# Patient Record
Sex: MALE | Race: WHITE | NOT HISPANIC OR LATINO | Employment: OTHER | URBAN - METROPOLITAN AREA
[De-identification: names, ages, dates, MRNs, and addresses within clinical notes are randomized per-mention and may not be internally consistent; named-entity substitution may affect disease eponyms.]

---

## 2021-03-02 DIAGNOSIS — Z23 IMMUNIZATION DUE: ICD-10-CM

## 2021-07-13 ENCOUNTER — OFFICE VISIT (OUTPATIENT)
Dept: GASTROENTEROLOGY | Facility: CLINIC | Age: 76
End: 2021-07-13

## 2021-07-13 VITALS
HEIGHT: 74 IN | BODY MASS INDEX: 30.54 KG/M2 | SYSTOLIC BLOOD PRESSURE: 128 MMHG | WEIGHT: 238 LBS | DIASTOLIC BLOOD PRESSURE: 72 MMHG

## 2021-07-13 DIAGNOSIS — K62.5 RECTAL BLEEDING: Primary | ICD-10-CM

## 2021-07-13 DIAGNOSIS — Z86.010 HISTORY OF COLON POLYPS: ICD-10-CM

## 2021-07-13 DIAGNOSIS — Z79.01 ANTICOAGULATED: ICD-10-CM

## 2021-07-13 PROCEDURE — 99204 OFFICE O/P NEW MOD 45 MIN: CPT | Performed by: INTERNAL MEDICINE

## 2021-07-13 RX ORDER — SODIUM CHLORIDE, SODIUM LACTATE, POTASSIUM CHLORIDE, CALCIUM CHLORIDE 600; 310; 30; 20 MG/100ML; MG/100ML; MG/100ML; MG/100ML
30 INJECTION, SOLUTION INTRAVENOUS CONTINUOUS
Status: CANCELLED | OUTPATIENT
Start: 2021-09-10

## 2021-07-13 NOTE — PROGRESS NOTES
Chief Complaint   Patient presents with   • Rectal Bleeding       Subjective     HPI    Bruce Moran is a 75 y.o. male with a past medical history noted below who presents for rectal bleeding.  Symptoms present for 1 month.  Reports bright red blood with wiping, stools normal color.  Not painful.  Reports soft BMs that are easy to pass.  He has not has significant issues with bleeding before.    He is on warfarin, he has a mechanical heart valve, followed by Dr Singh.      Diagnosed with Crohn's disease with Dr Smith.  Also history of colon polyps.  Says Crohn's diagnosed in 1987.  Reports at one time had 13 polyps removed.  Does not think he has had a scope in over 20 years ago.  Says he was on steroids, flagyl.  Says no meds since 2000.  Denies active disease symptoms.    No family history of GI malignancies.    No current pcp.  No recent labs.    Hx of laparascopic hernia repair.  He says he was able to bridge warfarin at that time.    Quit smoking 1991. No excess ETOH    Retired retail.          Today's visit was in the office.  Both the patient and I were wearing face masks and proper hand hygiene was performed before and after the physical exam.           Current Outpatient Medications:   •  digoxin (LANOXIN) 125 MCG tablet, Take 125 mcg by mouth., Disp: , Rfl:   •  propranolol LA (INDERAL LA) 60 MG 24 hr capsule, Take  by mouth., Disp: , Rfl:   •  warfarin (COUMADIN) 5 MG tablet, Take 1/2 tablet on  Monday, Wednesday, and friday, Disp: , Rfl:       Objective     Vitals:    07/13/21 1344   BP: 128/72         07/13/21  1344   Weight: 108 kg (238 lb)     Body mass index is 30.56 kg/m².    Physical Exam  Vitals reviewed.   Constitutional:       General: He is not in acute distress.     Appearance: Normal appearance. He is well-developed. He is not diaphoretic.   HENT:      Head: Normocephalic.   Neck:      Thyroid: No thyromegaly.   Cardiovascular:      Rate and Rhythm: Normal rate and regular rhythm.    Pulmonary:      Effort: Pulmonary effort is normal. No respiratory distress.      Breath sounds: Normal breath sounds. No wheezing.   Abdominal:      General: Bowel sounds are normal. There is no distension.      Palpations: Abdomen is soft. Abdomen is not rigid. There is no mass.      Tenderness: There is no abdominal tenderness. There is no guarding or rebound.      Hernia: No hernia is present.   Genitourinary:     Comments: Rectal exam deferred  Musculoskeletal:         General: No tenderness.   Neurological:      Mental Status: He is alert and oriented to person, place, and time.      Motor: No atrophy.      Coordination: Coordination normal.   Psychiatric:         Behavior: Behavior normal.         Thought Content: Thought content normal.         Judgment: Judgment normal.             WBC   Date Value Ref Range Status   04/19/2019 7.10 4.5 - 11.0 10*3/uL Final     RBC   Date Value Ref Range Status   04/19/2019 5.27 4.5 - 5.9 10*6/uL Final     Hemoglobin   Date Value Ref Range Status   04/19/2019 15.2 13.5 - 17.5 g/dL Final     Hematocrit   Date Value Ref Range Status   04/19/2019 47.8 41.0 - 53.0 % Final     MCV   Date Value Ref Range Status   04/19/2019 90.7 80.0 - 100.0 fL Final     MCH   Date Value Ref Range Status   04/19/2019 28.8 26.0 - 34.0 pg Final     MCHC   Date Value Ref Range Status   04/19/2019 31.8 31.0 - 37.0 g/dL Final     RDW   Date Value Ref Range Status   04/19/2019 14.9 12.0 - 16.8 % Final     MPV   Date Value Ref Range Status   04/19/2019 11.8 (H) 6.7 - 10.8 fL Final     Platelets   Date Value Ref Range Status   04/19/2019 224 140 - 440 10*3/uL Final     Neutrophil Rel %   Date Value Ref Range Status   04/19/2019 70.7 45 - 80 % Final     Lymphocyte Rel %   Date Value Ref Range Status   04/19/2019 20.8 15 - 50 % Final     Monocyte Rel %   Date Value Ref Range Status   04/19/2019 6.6 0 - 15 % Final     Eosinophil %   Date Value Ref Range Status   04/19/2019 1.5 0 - 7 % Final     Basophil  Rel %   Date Value Ref Range Status   04/19/2019 0.3 0 - 2 % Final     Immature Grans %   Date Value Ref Range Status   04/19/2019 0.1 (H) 0 % Final     Neutrophils Absolute   Date Value Ref Range Status   04/19/2019 5.01 2.0 - 8.8 10*3/uL Final     Lymphocytes Absolute   Date Value Ref Range Status   04/19/2019 1.48 0.7 - 5.5 10*3/uL Final     Monocytes Absolute   Date Value Ref Range Status   04/19/2019 0.47 0.0 - 1.7 10*3/uL Final     Eosinophils Absolute   Date Value Ref Range Status   04/19/2019 0.11 0.0 - 0.8 10*3/uL Final     Basophils Absolute   Date Value Ref Range Status   04/19/2019 0.02 0.0 - 0.2 10*3/uL Final     Immature Grans, Absolute   Date Value Ref Range Status   04/19/2019 0.01 <1 10*3/uL Final     nRBC   Date Value Ref Range Status   04/19/2019 0 0 /100(WBC) Final       Lab Results   Component Value Date    BUN 13 04/19/2019    CREATININE 1.4 04/19/2019    EGFRIFNONA 59 (L) 11/17/2016    EGFRIFAFRI 72 11/17/2016    BCR 9.3 04/19/2019    CO2 29 04/19/2019    CALCIUM 9.1 04/19/2019    PROTENTOTREF 7.2 11/17/2016    ALBUMIN 4.2 04/19/2019    LABIL2 1.0 (L) 04/19/2019    AST 25 04/19/2019    ALT 17 04/19/2019         Imaging Results (Last 7 Days)     ** No results found for the last 168 hours. **          I personally reviewed data as detailed below:     The labs listed above.    Office notes from: 2/17/21 cardiologist.        No notes on file    Assessment/Plan    1. Rectal bleeding: new acute issue over the past month.  This is worrisome in a 76yo M long overdue for surveillance colonoscopy.  New diagnosis, uncertain prognosis    2. Personal history of colon polyps: reports 13 polyps in the past, over 20 years since his last scope by his estimate    3. Anticoagulated: hx of mechanical heart valve, he says he has been bridged on lovenox for prior procedures    Plan  Labs today  Proceed with colonoscopy  Will reach out to his cardiologist regarding management of warfarin and lovenox bridge prior to  endoscopy    Diagnoses and all orders for this visit:    1. Rectal bleeding (Primary)  -     CBC & Differential  -     Comprehensive Metabolic Panel  -     Case Request; Standing  -     Follow Anesthesia Guidelines / Standing Orders; Future  -     Obtain Informed Consent; Future  -     Implement Anesthesia Orders Day of Procedure; Standing  -     Obtain Informed Consent; Standing  -     Verify bowel prep was successful; Standing  -     lactated ringers infusion  -     Case Request    2. Anticoagulated  -     CBC & Differential  -     Comprehensive Metabolic Panel    3. History of colon polyps  -     Case Request; Standing  -     Follow Anesthesia Guidelines / Standing Orders; Future  -     Obtain Informed Consent; Future  -     Implement Anesthesia Orders Day of Procedure; Standing  -     Obtain Informed Consent; Standing  -     Verify bowel prep was successful; Standing  -     lactated ringers infusion  -     Case Request        I have discussed the above plan with the patient.  They verbalize understanding and are in agreement with the plan.  They have been advised to contact the office for any questions, concerns, or changes related to their health.    Dictated utilizing Dragon dictation

## 2021-07-14 ENCOUNTER — TELEPHONE (OUTPATIENT)
Dept: GASTROENTEROLOGY | Facility: CLINIC | Age: 76
End: 2021-07-14

## 2021-07-14 LAB
ALBUMIN SERPL-MCNC: 4.5 G/DL (ref 3.5–5.2)
ALBUMIN/GLOB SERPL: 1.5 G/DL
ALP SERPL-CCNC: 67 U/L (ref 39–117)
ALT SERPL-CCNC: 13 U/L (ref 1–41)
AST SERPL-CCNC: 16 U/L (ref 1–40)
BASOPHILS # BLD AUTO: 0.03 10*3/MM3 (ref 0–0.2)
BASOPHILS NFR BLD AUTO: 0.4 % (ref 0–1.5)
BILIRUB SERPL-MCNC: 1.2 MG/DL (ref 0–1.2)
BUN SERPL-MCNC: 13 MG/DL (ref 8–23)
BUN/CREAT SERPL: 9.4 (ref 7–25)
CALCIUM SERPL-MCNC: 9.7 MG/DL (ref 8.6–10.5)
CHLORIDE SERPL-SCNC: 102 MMOL/L (ref 98–107)
CO2 SERPL-SCNC: 27 MMOL/L (ref 22–29)
CREAT SERPL-MCNC: 1.39 MG/DL (ref 0.76–1.27)
EOSINOPHIL # BLD AUTO: 0.04 10*3/MM3 (ref 0–0.4)
EOSINOPHIL NFR BLD AUTO: 0.5 % (ref 0.3–6.2)
ERYTHROCYTE [DISTWIDTH] IN BLOOD BY AUTOMATED COUNT: 13.3 % (ref 12.3–15.4)
GLOBULIN SER CALC-MCNC: 3.1 GM/DL
GLUCOSE SERPL-MCNC: 82 MG/DL (ref 65–99)
HCT VFR BLD AUTO: 45.5 % (ref 37.5–51)
HGB BLD-MCNC: 15.3 G/DL (ref 13–17.7)
IMM GRANULOCYTES # BLD AUTO: 0.05 10*3/MM3 (ref 0–0.05)
IMM GRANULOCYTES NFR BLD AUTO: 0.6 % (ref 0–0.5)
LYMPHOCYTES # BLD AUTO: 1.53 10*3/MM3 (ref 0.7–3.1)
LYMPHOCYTES NFR BLD AUTO: 18.1 % (ref 19.6–45.3)
MCH RBC QN AUTO: 29 PG (ref 26.6–33)
MCHC RBC AUTO-ENTMCNC: 33.6 G/DL (ref 31.5–35.7)
MCV RBC AUTO: 86.3 FL (ref 79–97)
MONOCYTES # BLD AUTO: 0.51 10*3/MM3 (ref 0.1–0.9)
MONOCYTES NFR BLD AUTO: 6 % (ref 5–12)
NEUTROPHILS # BLD AUTO: 6.27 10*3/MM3 (ref 1.7–7)
NEUTROPHILS NFR BLD AUTO: 74.4 % (ref 42.7–76)
NRBC BLD AUTO-RTO: 0 /100 WBC (ref 0–0.2)
PLATELET # BLD AUTO: 309 10*3/MM3 (ref 140–450)
POTASSIUM SERPL-SCNC: 4.5 MMOL/L (ref 3.5–5.2)
PROT SERPL-MCNC: 7.6 G/DL (ref 6–8.5)
RBC # BLD AUTO: 5.27 10*6/MM3 (ref 4.14–5.8)
SODIUM SERPL-SCNC: 141 MMOL/L (ref 136–145)
WBC # BLD AUTO: 8.43 10*3/MM3 (ref 3.4–10.8)

## 2021-07-14 NOTE — TELEPHONE ENCOUNTER
Request faxed  to DR Liban Snigh' office @ [phone] checking if pt may hold warfarin for 5 days prior to c/s to be scheduled, and to coordinate lovenox bridge if needed.  Confirmation received.

## 2021-07-14 NOTE — TELEPHONE ENCOUNTER
----- Message from Jeniffer Parker MD sent at 7/13/2021  7:52 PM EDT -----  Can we reach out to his cardiologist, Dr Singh, regarding holding is warfarin and likely need for lovenox bridge prior to his colonoscopy, thx

## 2021-07-16 NOTE — PROGRESS NOTES
Lab testing with normal CBC.    Normal liver testing, normal electrolytes.  His renal function is a little off but stable to where he was 2 years ago.    Does he have a nephrologist?  If not, I can refer him.

## 2021-07-19 ENCOUNTER — TELEPHONE (OUTPATIENT)
Dept: GASTROENTEROLOGY | Facility: CLINIC | Age: 76
End: 2021-07-19

## 2021-07-19 DIAGNOSIS — R79.89 ELEVATED SERUM CREATININE: Primary | ICD-10-CM

## 2021-07-19 NOTE — TELEPHONE ENCOUNTER
----- Message from Jeniffer Parker MD sent at 7/16/2021  4:03 PM EDT -----  Lab testing with normal CBC.    Normal liver testing, normal electrolytes.  His renal function is a little off but stable to where he was 2 years ago.    Does he have a nephrologist?  If not, I can refer him.

## 2021-07-19 NOTE — TELEPHONE ENCOUNTER
Call to DR Jair Alvarado's office and spoke with Emma.  Request response re: holding coumadin.  Awaiting reply.

## 2021-07-20 NOTE — TELEPHONE ENCOUNTER
Called pt and advised of Dr Parker note. Verb understanding.     Pt would like referral to nephrologist.  Message sent to Dr Parker.

## 2021-07-21 ENCOUNTER — TELEPHONE (OUTPATIENT)
Dept: GASTROENTEROLOGY | Facility: CLINIC | Age: 76
End: 2021-07-21

## 2021-07-21 PROBLEM — K62.5 RECTAL BLEEDING: Status: ACTIVE | Noted: 2021-07-21

## 2021-07-21 PROBLEM — Z86.010 HISTORY OF COLON POLYPS: Status: ACTIVE | Noted: 2021-07-21

## 2021-07-28 NOTE — TELEPHONE ENCOUNTER
Telephone Encounter - Florian Mckeon MD - 07/20/2021 4:56 PM EDT  Formatting of this note might be different from the original.  Will need Lovenox bridge with mechanical mitral valve. Patient is at acceptable cardiac risk for upcoming colonoscopy.    Paper from GI asks to be off Coumadin for 5 days. May hold Coumadin 5 days before colonoscopy and start Lovenox bridge 1 mg/kg subcutaneously twice daily 3 days before colonoscopy. Hold Lovenox the day of colonoscopy and resume Lovenox and Coumadin after colonoscopy as soon as possible when hemostasis obtained and when okay with performing provider. Discontinue Lovenox when post colonoscopy INR is 2.5 or greater. Coordinate with [facility] where pt follows.    Electronically signed by Florian Mckeon MD at 07/20/2021 5:01 PM EDT    **Call to pt.  Advise of above - hold 9/5 - 9/10.  Verb understanding.      Update to Dr Parker.  Regina Farnsworth RN.

## 2021-09-10 ENCOUNTER — ANESTHESIA EVENT (OUTPATIENT)
Dept: GASTROENTEROLOGY | Facility: HOSPITAL | Age: 76
End: 2021-09-10

## 2021-09-10 ENCOUNTER — ANESTHESIA (OUTPATIENT)
Dept: GASTROENTEROLOGY | Facility: HOSPITAL | Age: 76
End: 2021-09-10

## 2021-09-10 ENCOUNTER — HOSPITAL ENCOUNTER (OUTPATIENT)
Facility: HOSPITAL | Age: 76
Setting detail: HOSPITAL OUTPATIENT SURGERY
Discharge: HOME | End: 2021-09-10
Attending: INTERNAL MEDICINE | Admitting: INTERNAL MEDICINE

## 2021-09-10 VITALS
SYSTOLIC BLOOD PRESSURE: 102 MMHG | TEMPERATURE: 98 F | HEIGHT: 74 IN | HEART RATE: 83 BPM | OXYGEN SATURATION: 96 % | DIASTOLIC BLOOD PRESSURE: 62 MMHG | BODY MASS INDEX: 30.4 KG/M2 | WEIGHT: 236.9 LBS | RESPIRATION RATE: 18 BRPM

## 2021-09-10 DIAGNOSIS — Z87.19 HISTORY OF CROHN'S DISEASE: Primary | ICD-10-CM

## 2021-09-10 DIAGNOSIS — Z86.010 HISTORY OF COLON POLYPS: ICD-10-CM

## 2021-09-10 DIAGNOSIS — K62.5 RECTAL BLEEDING: ICD-10-CM

## 2021-09-10 PROCEDURE — C1889 IMPLANT/INSERT DEVICE, NOC: HCPCS | Performed by: INTERNAL MEDICINE

## 2021-09-10 PROCEDURE — 45385 COLONOSCOPY W/LESION REMOVAL: CPT | Performed by: INTERNAL MEDICINE

## 2021-09-10 PROCEDURE — 45380 COLONOSCOPY AND BIOPSY: CPT | Performed by: INTERNAL MEDICINE

## 2021-09-10 PROCEDURE — 88305 TISSUE EXAM BY PATHOLOGIST: CPT | Performed by: INTERNAL MEDICINE

## 2021-09-10 PROCEDURE — 25010000002 PROPOFOL 10 MG/ML EMULSION: Performed by: REGISTERED NURSE

## 2021-09-10 PROCEDURE — S0260 H&P FOR SURGERY: HCPCS | Performed by: INTERNAL MEDICINE

## 2021-09-10 PROCEDURE — 25010000002 PHENYLEPHRINE PER 1 ML: Performed by: REGISTERED NURSE

## 2021-09-10 DEVICE — DEV CLIP ENDO RESOLUTION360 CONTRL ROT 235CM: Type: IMPLANTABLE DEVICE | Site: SIGMOID COLON | Status: FUNCTIONAL

## 2021-09-10 RX ORDER — SODIUM CHLORIDE 0.9 % (FLUSH) 0.9 %
10 SYRINGE (ML) INJECTION AS NEEDED
Status: DISCONTINUED | OUTPATIENT
Start: 2021-09-10 | End: 2021-09-10 | Stop reason: HOSPADM

## 2021-09-10 RX ORDER — SODIUM CHLORIDE 0.9 % (FLUSH) 0.9 %
3 SYRINGE (ML) INJECTION EVERY 12 HOURS SCHEDULED
Status: DISCONTINUED | OUTPATIENT
Start: 2021-09-10 | End: 2021-09-10 | Stop reason: HOSPADM

## 2021-09-10 RX ORDER — LIDOCAINE HYDROCHLORIDE 20 MG/ML
INJECTION, SOLUTION INFILTRATION; PERINEURAL AS NEEDED
Status: DISCONTINUED | OUTPATIENT
Start: 2021-09-10 | End: 2021-09-10 | Stop reason: SURG

## 2021-09-10 RX ORDER — SODIUM CHLORIDE, SODIUM LACTATE, POTASSIUM CHLORIDE, CALCIUM CHLORIDE 600; 310; 30; 20 MG/100ML; MG/100ML; MG/100ML; MG/100ML
30 INJECTION, SOLUTION INTRAVENOUS CONTINUOUS PRN
Status: DISCONTINUED | OUTPATIENT
Start: 2021-09-10 | End: 2021-09-10 | Stop reason: HOSPADM

## 2021-09-10 RX ORDER — SODIUM CHLORIDE, SODIUM LACTATE, POTASSIUM CHLORIDE, CALCIUM CHLORIDE 600; 310; 30; 20 MG/100ML; MG/100ML; MG/100ML; MG/100ML
30 INJECTION, SOLUTION INTRAVENOUS CONTINUOUS
Status: DISCONTINUED | OUTPATIENT
Start: 2021-09-10 | End: 2021-09-10 | Stop reason: HOSPADM

## 2021-09-10 RX ORDER — PROPOFOL 10 MG/ML
VIAL (ML) INTRAVENOUS AS NEEDED
Status: DISCONTINUED | OUTPATIENT
Start: 2021-09-10 | End: 2021-09-10 | Stop reason: SURG

## 2021-09-10 RX ADMIN — LIDOCAINE HYDROCHLORIDE 60 MG: 20 INJECTION, SOLUTION INFILTRATION; PERINEURAL at 14:08

## 2021-09-10 RX ADMIN — PHENYLEPHRINE HYDROCHLORIDE 100 MCG: 10 INJECTION INTRAVENOUS at 14:22

## 2021-09-10 RX ADMIN — PROPOFOL 180 MCG/KG/MIN: 10 INJECTION, EMULSION INTRAVENOUS at 14:08

## 2021-09-10 RX ADMIN — PROPOFOL 60 MG: 10 INJECTION, EMULSION INTRAVENOUS at 14:08

## 2021-09-10 RX ADMIN — SODIUM CHLORIDE, POTASSIUM CHLORIDE, SODIUM LACTATE AND CALCIUM CHLORIDE 30 ML/HR: 600; 310; 30; 20 INJECTION, SOLUTION INTRAVENOUS at 12:56

## 2021-09-10 RX ADMIN — PHENYLEPHRINE HYDROCHLORIDE 200 MCG: 10 INJECTION INTRAVENOUS at 14:26

## 2021-09-10 RX ADMIN — PHENYLEPHRINE HYDROCHLORIDE 100 MCG: 10 INJECTION INTRAVENOUS at 14:31

## 2021-09-10 NOTE — ANESTHESIA POSTPROCEDURE EVALUATION
"Patient: Bruce Moran    Procedure Summary     Date: 09/10/21 Room / Location:  [facility]    Anesthesia Start: 1403 Anesthesia Stop: 1444    Procedure: COLONOSCOPY TO ANASTOMOSIS WITH BX'S AND COLD SNARE POLYPECTOMY WITH RESOLUTION CLIP PLACEMENT X2 AT SIGMOID (N/A ) Diagnosis:       Rectal bleeding      History of colon polyps      (Rectal bleeding [K62.5])      (History of colon polyps [Z86.010])    Surgeons: Jeniffer Parker MD Provider: Gilberto Alanis MD    Anesthesia Type: MAC ASA Status: 4          Anesthesia Type: MAC    Vitals  Vitals Value Taken Time   /62 09/10/21 1505   Temp     Pulse 83 09/10/21 1505   Resp 18 09/10/21 1505   SpO2 96 % 09/10/21 1505           Post Anesthesia Care and Evaluation    Patient location during evaluation: bedside  Patient participation: complete - patient participated  Level of consciousness: awake and alert  Pain management: adequate  Airway patency: patent  Anesthetic complications: No anesthetic complications  PONV Status: none  Cardiovascular status: acceptable  Respiratory status: acceptable  Hydration status: acceptable    Comments: /62 (BP Location: Left arm, Patient Position: Lying)   Pulse 83   Temp 36.7 °C (98 °F) (Oral)   Resp 18   Ht 188 cm (74\")   Wt 107 kg (236 lb 14.4 oz)   SpO2 96%   BMI 30.42 kg/m²         "

## 2021-09-10 NOTE — H&P
"[facility]  Pre Procedure History & Physical    Chief Complaint: Personal history of colon polyps, rectal bleeding, history of Crohn's disease    76 y.o. male with a past medical history noted below who presents for rectal bleeding.  Symptoms present for 1 month.  Reports bright red blood with wiping, stools normal color.  Not painful.  Reports soft BMs that are easy to pass.  He has not has significant issues with bleeding before.     He is on warfarin, he has a mechanical heart valve, followed by Dr Singh.       Diagnosed with Crohn's disease with Dr Smith.  Also history of colon polyps.  Says Crohn's diagnosed in 1987.  Reports at one time had 13 polyps removed.  Does not think he has had a scope in over 20 years ago.  Says he was on steroids, flagyl.  Says no meds since 2000.  Denies active disease symptoms.     No family history of GI malignancies.     No current pcp.  No recent labs.     Hx of laparascopic hernia repair.  He says he was able to bridge warfarin at that time.     Quit smoking 1991. No excess ETOH     Retired retail.      Per his cardiologist  \"Will need Lovenox bridge with mechanical mitral valve. Patient is at acceptable cardiac risk for upcoming colonoscopy.   May hold Coumadin 5 days before colonoscopy and start Lovenox bridge 1 mg/kg subcutaneously twice daily 3 days before colonoscopy. Hold Lovenox the day of colonoscopy and resume Lovenox and Coumadin after colonoscopy as soon as possible when hemostasis obtained and when okay with performing physician.\"    He has been cleared to bridge his anticoagulation by his cardiologist.  HPI:     Past Medical History:   Past Medical History:   Diagnosis Date   • A-fib (CMS/HCC)     with controlled VR followed by Robert at [facility]   • Allergic conjunctivitis     Bilaterally   • BPH (benign prostatic hypertrophy)    • Bronchitis     Mucoprulent bronchitis   • Condition not found     Multiple Polyps   • Dizziness     Persistent " dizziness / inability to  line   • Elevated PSA     here for 4 month follow up   • Familial tremor     Familial Intention Tremors   • Family history of Parkinson's disease     Tremors with FH of Parkinsons disease   • H/O parasitic infection     H/O parasite passage at 10 years of age   • Hayfever    • Heart murmur    • Hidradenitis 2000   • Hidradenitis suppurativa     treated by Doxycycline per Dermatolofy  of the left groin referred to Dermatology   • History of EKG 10/15/2009   • History of stress test 10/30/2009   • Hypertension    • Infected cyst of right shoulder     resolved   • Left inguinal hernia     Hernia Left Inguinal and Periumbilical repaired 1/5/10 Figert   • Lightheadedness    • Nosebleed     Nosebleeds   • Parkinsons disease (CMS/HCC)     Tremors due to Parkinsons disease   • Perianal Crohn's disease (CMS/HCC)     Perianal Crohn's 1987 / Polyps in past  Surgery 1991 - no reoccurence   • Pneumonia 1947    Hospitalization  Facility: [facility]   • Pre-syncope     Presyncopal like episodes   • Risk for falls     Increased Fall Risk   • Rosacea    • S/P MVR (mitral valve repair)     MVR mechanical on coumadin   • Stenosis, cervix     Cervical stenosis on MRI R>L, arm pain   • Tremor     Tremors stable   • 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   • Vertigo    • Weight gain    • White matter changes     of brain on MRI       Family History:  Family History   Problem Relation Age of Onset   • Ovarian cancer Mother    • Parkinsonism Father    • Malig Hyperthermia Neg Hx        Social History:   reports that he has quit smoking. He has never used smokeless tobacco. He reports that he does not drink alcohol and does not use drugs.    Medications:   No medications prior to admission.       Allergies:  Erythromycin    ROS:    Pertinent items are noted in HPI     Objective     There were no vitals taken for this visit.    Physical Exam   Constitutional: Pt is oriented  to person, place, and time and well-developed, well-nourished, and in no distress.   HENT:   Mouth/Throat: Oropharynx is clear and moist.   Neck: Normal range of motion. Neck supple.   Cardiovascular: Normal rate, regular rhythm and normal heart sounds.    Pulmonary/Chest: Effort normal and breath sounds normal. No respiratory distress. No  wheezes.   Abdominal: Soft. Bowel sounds are normal.   Skin: Skin is warm and dry.   Psychiatric: Mood, memory, affect and judgment normal.     Assessment/Plan     Diagnosis: Personal history of colon polyps, rectal bleeding, history of Crohn's disease       Anticipated Surgical Procedure:    Colonoscopy    The risks, benefits, and alternatives of this procedure have been discussed with the patient or the responsible party- the patient understands and agrees to proceed.

## 2021-09-10 NOTE — ANESTHESIA PREPROCEDURE EVALUATION
Anesthesia Evaluation     Patient summary reviewed and Nursing notes reviewed   NPO Solid Status: > 8 hours             Airway   Mallampati: II  TM distance: >3 FB  Neck ROM: full  no difficulty expected  Dental - normal exam     Pulmonary - normal exam   (+) pneumonia ,   Cardiovascular - normal exam    (+) hypertension, valvular problems/murmurs (h/o MVR) murmur, dysrhythmias Atrial Fib,       Neuro/Psych  (+) dizziness/light headedness, tremors,       ROS Comment: Parkinson's  GI/Hepatic/Renal/Endo    (+)  GI bleeding ,     Musculoskeletal     Abdominal  - normal exam   Substance History      OB/GYN          Other                        Anesthesia Plan    ASA 4     MAC       Anesthetic plan, all risks, benefits, and alternatives have been provided, discussed and informed consent has been obtained with: patient.

## 2021-09-13 LAB
LAB AP CASE REPORT: NORMAL
PATH REPORT.FINAL DX SPEC: NORMAL
PATH REPORT.GROSS SPEC: NORMAL

## 2021-09-16 NOTE — PROGRESS NOTES
His colon polyp was a sessile serrated adenoma.    The rectal tissue showed benign hyperplastic tissue. No worrisome inflammation.    I will follow-up CT scan results.No recall needed based on age.

## 2021-09-23 ENCOUNTER — HOSPITAL ENCOUNTER (OUTPATIENT)
Dept: CT IMAGING | Facility: HOSPITAL | Age: 76
Discharge: HOME | End: 2021-09-23
Admitting: INTERNAL MEDICINE

## 2021-09-23 DIAGNOSIS — Z87.19 HISTORY OF CROHN'S DISEASE: ICD-10-CM

## 2021-09-23 PROCEDURE — 25010000002 IOPAMIDOL 61 % SOLUTION: Performed by: INTERNAL MEDICINE

## 2021-09-23 PROCEDURE — 74177 CT ABD & PELVIS W/CONTRAST: CPT

## 2021-09-23 PROCEDURE — 82565 ASSAY OF CREATININE: CPT

## 2021-09-23 RX ADMIN — IOPAMIDOL 85 ML: 612 INJECTION, SOLUTION INTRAVENOUS at 11:49

## 2021-09-24 LAB — CREAT BLDA-MCNC: 1.4 MG/DL (ref 0.6–1.3)

## 2021-09-30 ENCOUNTER — TELEPHONE (OUTPATIENT)
Dept: GASTROENTEROLOGY | Facility: CLINIC | Age: 76
End: 2021-09-30

## 2021-09-30 NOTE — TELEPHONE ENCOUNTER
----- Message from Jeniffer Parker MD sent at 9/16/2021  3:02 PM EDT -----  His colon polyp was a sessile serrated adenoma.    The rectal tissue showed benign hyperplastic tissue. No worrisome inflammation.    I will follow-up CT scan results.No recall needed based on age.

## 2021-10-07 ENCOUNTER — TELEPHONE (OUTPATIENT)
Dept: GASTROENTEROLOGY | Facility: CLINIC | Age: 76
End: 2021-10-07

## 2021-10-07 DIAGNOSIS — N40.0 ENLARGED PROSTATE: Primary | ICD-10-CM

## 2021-10-07 NOTE — TELEPHONE ENCOUNTER
----- Message from Jeniffer Parker MD sent at 10/7/2021  3:20 PM EDT -----  CT scan shows mild fatty liver.  No inflammation identified of the ileum.  Enlargement of the prostate.I am referring him to urology for further assessment of the enlarged prostate    Office follow up 4-6 weeks, thx

## 2021-10-07 NOTE — TELEPHONE ENCOUNTER
Called pt and advised of Dr Parker note. Verb understanding.   F/u made for 11/10 at 11a with Krissy CASEY.

## 2021-10-22 ENCOUNTER — TELEPHONE (OUTPATIENT)
Dept: GASTROENTEROLOGY | Facility: CLINIC | Age: 76
End: 2021-10-22

## 2021-10-22 NOTE — TELEPHONE ENCOUNTER
spoke with pt  titus rene for dr ortega on nov 5at 0930 pt to call [phone] if he needs to reschedule records faxed to [phone]

## 2021-11-10 ENCOUNTER — OFFICE VISIT (OUTPATIENT)
Dept: GASTROENTEROLOGY | Facility: CLINIC | Age: 76
End: 2021-11-10

## 2021-11-10 VITALS — BODY MASS INDEX: 31.18 KG/M2 | HEIGHT: 74 IN | WEIGHT: 243 LBS | TEMPERATURE: 97.3 F

## 2021-11-10 DIAGNOSIS — K62.89 PROCTITIS: ICD-10-CM

## 2021-11-10 DIAGNOSIS — K62.5 RECTAL BLEEDING: Primary | ICD-10-CM

## 2021-11-10 DIAGNOSIS — K50.00 CROHN'S DISEASE OF SMALL INTESTINE WITHOUT COMPLICATION (HCC): ICD-10-CM

## 2021-11-10 DIAGNOSIS — D12.6 ADENOMATOUS POLYP OF COLON, UNSPECIFIED PART OF COLON: ICD-10-CM

## 2021-11-10 PROCEDURE — 99214 OFFICE O/P EST MOD 30 MIN: CPT | Performed by: NURSE PRACTITIONER

## 2021-11-10 NOTE — PROGRESS NOTES
Chief Complaint   Patient presents with   • Rectal Bleeding   • Crohn's Disease       Bruce Moran is a  76 y.o. male here for a follow up visit for rectal bleeding.    HPI  76-year-old male presents today for follow-up visit for rectal bleeding and Crohn's disease.  He is a patient of Dr. aPrker.  He was last seen in the office on 7/13/2021.  He is new to me today.  He underwent colonoscopy on 9/10/2021 that showed a benign-appearing stenosis of the terminal ileum as well as 1 adenomatous colon polyp, rectal hyperplastic colon polyp and proctitis with nonbleeding internal hemorrhoids.  At that time patient was having lots of rectal bleeding.  He is happy to report since a colonoscopy he might have have had 1-2 episodes of rectal bleeding but he says it really has cleared up.  He does have a history of A. fib and is on Coumadin.  He is happy report his bowels are moving well every day.  His only complaint today is excessive gas and bloating.  He tells me sometimes the gas can be really bad and can last all night.  He is not really sure if it something he is eating or what it is.  He tells me it is very embarrassing.  He had a CT scan of the abdomen pelvis done recently that showed mild fatty liver disease with an enlarged prostate.  Most recent LFTs were normal.  He did go see a urologist and had a PSA done and is waiting on those results.  He has a history of Crohn's disease diagnosed initially in 1987.  He is not currently on any medication for maintenance of his Crohn's disease.  He denies any dysphagia, reflux, abdominal pain, nausea and vomiting, diarrhea, constipation or melena.  He admits his appetite is good and his weight has gone up 7 pounds since his scope.  He tells me he is up-to-date with all of his health screenings and vaccinations.  He did have 3 days in a row of nosebleeds bursting in the morning but he thinks that was weather related.  Past Medical History:   Diagnosis Date   • A-fib (HCC)      with controlled VR followed by Robert at [facility]   • Allergic conjunctivitis     Bilaterally   • BPH (benign prostatic hypertrophy)    • Bronchitis     Mucoprulent bronchitis   • Condition not found     Multiple Polyps   • Dizziness     Persistent dizziness / inability to  line   • Elevated PSA     here for 4 month follow up   • Familial tremor     Familial Intention Tremors   • Family history of Parkinson's disease     Tremors with FH of Parkinsons disease   • H/O parasitic infection     H/O parasite passage at 10 years of age   • Hayfever    • Heart murmur    • Hidradenitis 2000   • Hidradenitis suppurativa     treated by Doxycycline per Dermatolofy  of the left groin referred to Dermatology   • History of EKG 10/15/2009   • History of stress test 10/30/2009   • Hypertension    • Infected cyst of right shoulder     resolved   • Left inguinal hernia     Hernia Left Inguinal and Periumbilical repaired 1/5/10 [facility]   • Lightheadedness    • Nosebleed     Nosebleeds   • Parkinsons disease (HCC)     Tremors due to Parkinsons disease   • Perianal Crohn's disease (HCC)     Perianal Crohn's 1987 / Polyps in past  Surgery 1991 - no reoccurence   • Pneumonia 1947    Hospitalization  Facility: [facility]   • Pre-syncope     Presyncopal like episodes   • Risk for falls     Increased Fall Risk   • Rosacea    • S/P MVR (mitral valve repair)     MVR mechanical on coumadin   • Stenosis, cervix     Cervical stenosis on MRI R>L, arm pain   • Tremor     Tremors stable   • 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   • Vertigo    • Weight gain    • White matter changes     of brain on MRI       Past Surgical History:   Procedure Laterality Date   • CARDIAC CATHETERIZATION     • CARDIAC SURGERY     • COLONOSCOPY  1990   • COLONOSCOPY  2002   • COLONOSCOPY N/A 9/10/2021    Procedure: COLONOSCOPY TO ANASTOMOSIS WITH BX'S AND COLD SNARE POLYPECTOMY WITH RESOLUTION CLIP PLACEMENT X2 AT SIGMOID;  Surgeon:  Jeniffer Parker MD;  Location: [facility] ENDOSCOPY;  Service: Gastroenterology;  Laterality: N/A;  pre: RECTAL BLEEDING, HX OF POLYPS, HX OF CHRON'S  post: PROCTITIS, DIVERTICULOSIS, ANASTOMOSIS, POLYP, HEMORRHOIDS   • HERNIA REPAIR  01/05/2010    [facility]. umbilical and inguinal   • HIDRADENITIS EXCISION     • HIDRADENITIS EXCISION  2000    SURG   Hidradenitis   • INGUINAL HERNIA REPAIR Left 01/05/2010    SURG   [facility] left Inguinal/abdominal hernia   • MITRAL VALVE REPLACEMENT  06/01/2005   • OTHER SURGICAL HISTORY  2000    PROC   Atrial Fibrillation   • OTHER SURGICAL HISTORY  2005    PROC   EPS [place] Procedure by Dr. Arguello at [facility]   • PACEMAKER IMPLANTATION     • PERIANAL LESION/CYST EXCISION  1991    Radha[facility]Anal Chron's at [facility]   • SHOULDER SURGERY  1990       Scheduled Meds:    Continuous Infusions:No current facility-administered medications for this visit.      PRN Meds:.    Allergies   Allergen Reactions   • Erythromycin        Social History     Socioeconomic History   • Marital status:    • Number of children: 3   • Years of education: 12 + 4   Tobacco Use   • Smoking status: Former Smoker   • Smokeless tobacco: Never Used   Substance and Sexual Activity   • Alcohol use: No   • Drug use: No   • Sexual activity: Not Currently     Partners: Female       Family History   Problem Relation Age of Onset   • Ovarian cancer Mother    • Parkinsonism Father    • Malig Hyperthermia Neg Hx        Review of Systems   Constitutional: Negative for appetite change, chills, diaphoresis, fatigue, fever and unexpected weight change.   HENT: Positive for nosebleeds. Negative for postnasal drip, sore throat, trouble swallowing and voice change.    Respiratory: Negative for cough, choking, chest tightness, shortness of breath, wheezing and stridor.    Cardiovascular: Negative for chest pain, palpitations and leg swelling.   Gastrointestinal: Negative for abdominal distention, abdominal pain, anal bleeding,  blood in stool, constipation, diarrhea, nausea, rectal pain and vomiting.   Endocrine: Negative for polydipsia, polyphagia and polyuria.   Musculoskeletal: Negative for gait problem.   Skin: Negative for rash and wound.   Allergic/Immunologic: Negative for food allergies.   Neurological: Negative for dizziness, speech difficulty and light-headedness.   Psychiatric/Behavioral: Negative for confusion, self-injury, sleep disturbance and suicidal ideas.       Vitals:    11/10/21 1118   Temp: 97.3 °F (36.3 °C)       Physical Exam  Constitutional:       General: He is not in acute distress.     Appearance: He is well-developed. He is not ill-appearing.   HENT:      Head: Normocephalic.   Eyes:      Pupils: Pupils are equal, round, and reactive to light.   Cardiovascular:      Rate and Rhythm: Normal rate and regular rhythm.      Heart sounds: Normal heart sounds.   Pulmonary:      Effort: Pulmonary effort is normal.      Breath sounds: Normal breath sounds.   Abdominal:      General: Bowel sounds are normal. There is no distension.      Palpations: Abdomen is soft. There is no mass.      Tenderness: There is no abdominal tenderness. There is no guarding or rebound.      Hernia: No hernia is present.   Musculoskeletal:         General: Normal range of motion.   Skin:     General: Skin is warm and dry.   Neurological:      Mental Status: He is alert and oriented to person, place, and time.   Psychiatric:         Speech: Speech normal.         Behavior: Behavior normal.         Judgment: Judgment normal.         No radiology results for the last 7 days     Diagnoses and all orders for this visit:    1. Rectal bleeding (Primary)  Overview:  Added automatically from request for surgery [id number]      2. Crohn's disease of small intestine without complication (HCC)    3. Proctitis    4. Adenomatous polyp of colon, unspecified part of colon    Reviewed colonoscopy results with him today.  Crohn's disease and seems pretty well  controlled currently.  I do recommend that he try Gas-X over-the-counter or a daily probiotic to help with his excessive gas.  Bowels are moving well currently.  Continue a high-fiber diet.  Continue daily exercise as tolerated.  Sounds like rectal bleeding has pretty much resolved.  I did explain to the patient if his bleeding were to return with him on Coumadin it would be very important for him to tell us immediately.  Overall he seems to be doing much better.  Patient to call the office with any issues.  Patient to follow-up with Dr. Parker in 6 months.  Patient is agreeable to the plan.

## 2022-02-11 ENCOUNTER — TELEPHONE (OUTPATIENT)
Dept: GASTROENTEROLOGY | Facility: CLINIC | Age: 77
End: 2022-02-11

## 2022-02-11 NOTE — TELEPHONE ENCOUNTER
This Patient has a  referral for [facility] that was entered on 07/20/2021. Is this referral still needed? Please advise

## 2022-02-16 NOTE — TELEPHONE ENCOUNTER
Referral has been faxed to Iwona @ [facility] Fax#[phone]. They will contact PT to schedule OV

## 2022-05-10 ENCOUNTER — OFFICE VISIT (OUTPATIENT)
Dept: GASTROENTEROLOGY | Facility: CLINIC | Age: 77
End: 2022-05-10

## 2022-05-10 VITALS
HEIGHT: 74 IN | SYSTOLIC BLOOD PRESSURE: 112 MMHG | WEIGHT: 243.6 LBS | DIASTOLIC BLOOD PRESSURE: 70 MMHG | TEMPERATURE: 97.1 F | BODY MASS INDEX: 31.26 KG/M2

## 2022-05-10 DIAGNOSIS — Z87.19 HISTORY OF CROHN'S DISEASE: ICD-10-CM

## 2022-05-10 DIAGNOSIS — D12.6 ADENOMATOUS POLYP OF COLON, UNSPECIFIED PART OF COLON: ICD-10-CM

## 2022-05-10 DIAGNOSIS — K62.5 RECTAL BLEEDING: Primary | ICD-10-CM

## 2022-05-10 PROBLEM — K50.00 CROHN'S DISEASE OF SMALL INTESTINE WITHOUT COMPLICATION: Status: ACTIVE | Noted: 2022-05-10

## 2022-05-10 PROCEDURE — 99213 OFFICE O/P EST LOW 20 MIN: CPT | Performed by: INTERNAL MEDICINE

## 2022-05-10 NOTE — PROGRESS NOTES
Chief Complaint   Patient presents with   • Crohn's Disease       Subjective     HPI    Bruce Moran is a 76 y.o. male with a past medical history noted below who presents for follow-up of rectal bleeding, history of Crohn's disease, personal history of colon polyps.    Today in follow up he is doing well.  No further rectal bleeding.  No abdominal pain.  No change in bowel habits.      Stable on his cardiac medications.    He states his only thing that he has issues with it is occasional vertigo.    He no longer has a primary care physician.    Today's visit was in the office.  Both the patient and I were wearing face masks and proper hand hygiene was performed before and after the physical exam.           Current Outpatient Medications:   •  digoxin (LANOXIN) 125 MCG tablet, Take 125 mcg by mouth Every Evening., Disp: , Rfl:   •  propranolol LA (INDERAL LA) 60 MG 24 hr capsule, Take  by mouth Every Evening., Disp: , Rfl:   •  warfarin (COUMADIN) 5 MG tablet, Take 1/2 tablet on  Monday, Wednesday, and friday, Disp: , Rfl:   •  Enoxaparin Sodium (LOVENOX IJ), Inject  as directed., Disp: , Rfl:       Objective     Vitals:    05/10/22 1250   BP: 112/70   Temp: 97.1 °F (36.2 °C)         05/10/22  1250   Weight: 110 kg (243 lb 9.6 oz)     Body mass index is 31.28 kg/m².    Physical Exam        WBC   Date Value Ref Range Status   07/13/2021 8.43 3.40 - 10.80 10*3/mm3 Final   04/19/2019 7.10 4.5 - 11.0 10*3/uL Final     RBC   Date Value Ref Range Status   07/13/2021 5.27 4.14 - 5.80 10*6/mm3 Final   04/19/2019 5.27 4.5 - 5.9 10*6/uL Final     Hemoglobin   Date Value Ref Range Status   07/13/2021 15.3 13.0 - 17.7 g/dL Final   04/19/2019 15.2 13.5 - 17.5 g/dL Final     Hematocrit   Date Value Ref Range Status   07/13/2021 45.5 37.5 - 51.0 % Final   04/19/2019 47.8 41.0 - 53.0 % Final     MCV   Date Value Ref Range Status   07/13/2021 86.3 79.0 - 97.0 fL Final   04/19/2019 90.7 80.0 - 100.0 fL Final     MCH   Date Value Ref  Range Status   07/13/2021 29.0 26.6 - 33.0 pg Final   04/19/2019 28.8 26.0 - 34.0 pg Final     MCHC   Date Value Ref Range Status   07/13/2021 33.6 31.5 - 35.7 g/dL Final   04/19/2019 31.8 31.0 - 37.0 g/dL Final     RDW   Date Value Ref Range Status   07/13/2021 13.3 12.3 - 15.4 % Final   04/19/2019 14.9 12.0 - 16.8 % Final     MPV   Date Value Ref Range Status   04/19/2019 11.8 (H) 6.7 - 10.8 fL Final     Platelets   Date Value Ref Range Status   07/13/2021 309 140 - 450 10*3/mm3 Final   04/19/2019 224 140 - 440 10*3/uL Final     Neutrophil Rel %   Date Value Ref Range Status   07/13/2021 74.4 42.7 - 76.0 % Final   04/19/2019 70.7 45 - 80 % Final     Lymphocyte Rel %   Date Value Ref Range Status   07/13/2021 18.1 (L) 19.6 - 45.3 % Final   04/19/2019 20.8 15 - 50 % Final     Monocyte Rel %   Date Value Ref Range Status   07/13/2021 6.0 5.0 - 12.0 % Final   04/19/2019 6.6 0 - 15 % Final     Eosinophil Rel %   Date Value Ref Range Status   07/13/2021 0.5 0.3 - 6.2 % Final     Eosinophil %   Date Value Ref Range Status   04/19/2019 1.5 0 - 7 % Final     Basophil Rel %   Date Value Ref Range Status   07/13/2021 0.4 0.0 - 1.5 % Final   04/19/2019 0.3 0 - 2 % Final     Immature Grans %   Date Value Ref Range Status   04/19/2019 0.1 (H) 0 % Final     Neutrophils Absolute   Date Value Ref Range Status   07/13/2021 6.27 1.70 - 7.00 10*3/mm3 Final   04/19/2019 5.01 2.0 - 8.8 10*3/uL Final     Lymphocytes Absolute   Date Value Ref Range Status   07/13/2021 1.53 0.70 - 3.10 10*3/mm3 Final   04/19/2019 1.48 0.7 - 5.5 10*3/uL Final     Monocytes Absolute   Date Value Ref Range Status   07/13/2021 0.51 0.10 - 0.90 10*3/mm3 Final   04/19/2019 0.47 0.0 - 1.7 10*3/uL Final     Eosinophils Absolute   Date Value Ref Range Status   07/13/2021 0.04 0.00 - 0.40 10*3/mm3 Final   04/19/2019 0.11 0.0 - 0.8 10*3/uL Final     Basophils Absolute   Date Value Ref Range Status   07/13/2021 0.03 0.00 - 0.20 10*3/mm3 Final   04/19/2019 0.02 0.0 -  0.2 10*3/uL Final     Immature Grans, Absolute   Date Value Ref Range Status   2019 0.01 <1 10*3/uL Final     nRBC   Date Value Ref Range Status   2021 0.0 0.0 - 0.2 /100 WBC Final   2019 0 0 /100(WBC) Final       Lab Results   Component Value Date    GLUCOSE 82 2021    BUN 13 2021    CREATININE 1.40 (H) 2021    EGFRIFNONA 50 (L) 2021    EGFRIFAFRI 60 (L) 2021    BCR 9.4 2021    CO2 27.0 2021    CALCIUM 9.7 2021    PROTENTOTREF 7.6 2021    ALBUMIN 4.50 2021    LABIL2 1.5 2021    AST 16 2021    ALT 13 2021         CT SCAN OF THE ABDOMEN AND PELVIS WITH INTRAVENOUS CONTRAST     HISTORY: Crohn's disease. Narrowed ileum. Recent colonoscopy for  evaluation of rectal bleeding and polyps and previous anastomosis.     The CT scan was performed through the abdomen and pelvis with  intravenous contrast and demonstrates the followin. The lung bases are clear. There is mild diffuse fatty infiltration of  the liver. The spleen, pancreas, both adrenal glands, and both kidneys  are unremarkable. The gallbladder contains at least one small gallstone.  There is no gallbladder wall thickening.  2. There is no aortic aneurysm or retroperitoneal lymphadenopathy.  The large and small bowel loops are normal in caliber. No narrowing or  inflammatory change of the ileum is identified by CT scan.  3. In the pelvis, there is slight enlargement of the prostate measuring  5.2 cm and this includes enlargement of the median lobe which indents  the base of the urinary bladder. There is no thickening of the bladder  wall. The perirectal fat planes are well-maintained.              Radiation dose reduction techniques were utilized, including automated  exposure control and exposure modulation based on body size.     This report was finalized on 2021 10:24 AM by Dr. Evangelista Das M.D.    I personally reviewed data as detailed below:     The  labs listed above.    The radiology studies listed above.    Office notes from: 3/28/22 cardiology    Endoscopy procedures and pathology from: 9/10/21 colonoscopy    Assessment   1. Rectal bleeding: No further episodes.  He had hemorrhoids on his recent colonoscopy    2. History of Crohn's disease: Some suspected narrowing in his terminal ileum versus anatomical variant.  Reassuring imaging on CT enterography    3. Adenomatous polyp of colon    Plan  Continue to monitor symptoms  Update labs today with CBC, CMP, vitamin D and B12 levels  He will let me know if he does develop any concerning changes with either recurrence of blood in his stool, abdominal pain, change in bowel habits or other concerning changes but otherwise I will have him follow-up with me in the office in 1 year  I did encourage him to seek out a primary care physician and advised him to the [facility] referral line for that    Diagnoses and all orders for this visit:    1. Rectal bleeding (Primary)  -     Comprehensive Metabolic Panel  -     Vitamin D 25 Hydroxy  -     CBC & Differential    2. History of Crohn's disease  -     Comprehensive Metabolic Panel  -     Vitamin D 25 Hydroxy  -     CBC & Differential  -     Vitamin B12    3. Adenomatous polyp of colon, unspecified part of colon    4. Body mass index (BMI) 31.0-31.9, adult   -     Vitamin D 25 Hydroxy        I have discussed the above plan with the patient.  They verbalize understanding and are in agreement with the plan.  They have been advised to contact the office for any questions, concerns, or changes related to their health.    Dictated utilizing Dragon dictation

## 2022-05-11 LAB
25(OH)D3+25(OH)D2 SERPL-MCNC: 18 NG/ML (ref 30–100)
ALBUMIN SERPL-MCNC: 4.5 G/DL (ref 3.7–4.7)
ALBUMIN/GLOB SERPL: 1.5 {RATIO} (ref 1.2–2.2)
ALP SERPL-CCNC: 60 IU/L (ref 44–121)
ALT SERPL-CCNC: 14 IU/L (ref 0–44)
AST SERPL-CCNC: 20 IU/L (ref 0–40)
BASOPHILS # BLD AUTO: 0 X10E3/UL (ref 0–0.2)
BASOPHILS NFR BLD AUTO: 1 %
BILIRUB SERPL-MCNC: 1.1 MG/DL (ref 0–1.2)
BUN SERPL-MCNC: 12 MG/DL (ref 8–27)
BUN/CREAT SERPL: 9 (ref 10–24)
CALCIUM SERPL-MCNC: 9.3 MG/DL (ref 8.6–10.2)
CHLORIDE SERPL-SCNC: 105 MMOL/L (ref 96–106)
CO2 SERPL-SCNC: 22 MMOL/L (ref 20–29)
CREAT SERPL-MCNC: 1.36 MG/DL (ref 0.76–1.27)
EGFRCR SERPLBLD CKD-EPI 2021: 54 ML/MIN/1.73
EOSINOPHIL # BLD AUTO: 0.1 X10E3/UL (ref 0–0.4)
EOSINOPHIL NFR BLD AUTO: 1 %
ERYTHROCYTE [DISTWIDTH] IN BLOOD BY AUTOMATED COUNT: 14.1 % (ref 11.6–15.4)
GLOBULIN SER CALC-MCNC: 3 G/DL (ref 1.5–4.5)
GLUCOSE SERPL-MCNC: 88 MG/DL (ref 65–99)
HCT VFR BLD AUTO: 45.8 % (ref 37.5–51)
HGB BLD-MCNC: 15.2 G/DL (ref 13–17.7)
IMM GRANULOCYTES # BLD AUTO: 0 X10E3/UL (ref 0–0.1)
IMM GRANULOCYTES NFR BLD AUTO: 0 %
LYMPHOCYTES # BLD AUTO: 1.6 X10E3/UL (ref 0.7–3.1)
LYMPHOCYTES NFR BLD AUTO: 19 %
MCH RBC QN AUTO: 28.9 PG (ref 26.6–33)
MCHC RBC AUTO-ENTMCNC: 33.2 G/DL (ref 31.5–35.7)
MCV RBC AUTO: 87 FL (ref 79–97)
MONOCYTES # BLD AUTO: 0.7 X10E3/UL (ref 0.1–0.9)
MONOCYTES NFR BLD AUTO: 8 %
NEUTROPHILS # BLD AUTO: 5.7 X10E3/UL (ref 1.4–7)
NEUTROPHILS NFR BLD AUTO: 71 %
PLATELET # BLD AUTO: 221 X10E3/UL (ref 150–450)
POTASSIUM SERPL-SCNC: 4.5 MMOL/L (ref 3.5–5.2)
PROT SERPL-MCNC: 7.5 G/DL (ref 6–8.5)
RBC # BLD AUTO: 5.26 X10E6/UL (ref 4.14–5.8)
SODIUM SERPL-SCNC: 143 MMOL/L (ref 134–144)
VIT B12 SERPL-MCNC: 112 PG/ML (ref 232–1245)
WBC # BLD AUTO: 8.1 X10E3/UL (ref 3.4–10.8)

## 2022-05-12 ENCOUNTER — TELEPHONE (OUTPATIENT)
Dept: GASTROENTEROLOGY | Facility: CLINIC | Age: 77
End: 2022-05-12

## 2022-05-12 DIAGNOSIS — E55.9 HYPOVITAMINOSIS D: Primary | ICD-10-CM

## 2022-05-12 DIAGNOSIS — E53.8 VITAMIN B12 DEFICIENCY: ICD-10-CM

## 2022-05-12 DIAGNOSIS — R79.89 ELEVATED SERUM CREATININE: Primary | ICD-10-CM

## 2022-05-12 RX ORDER — MAGNESIUM 200 MG
1 TABLET ORAL DAILY
Qty: 90 EACH | Refills: 1 | Status: SHIPPED | OUTPATIENT
Start: 2022-05-12

## 2022-05-12 NOTE — TELEPHONE ENCOUNTER
----- Message from Jeniffer Parker MD sent at 5/12/2022  2:39 PM EDT -----  CMP is stable, he does have evidence of chronic kidney that is stable.  If he doesn't have a nephrologist, I can refer him to one    His vitamin D level is very low, I am ordering supplementation    His B12 level is low, I am ordering supplementation for that also    Will plan to repeat labs in 3 months to follow, orders placed

## 2022-05-12 NOTE — TELEPHONE ENCOUNTER
Called pt and advised of Dr Parker note. Verb understanding.     Pt made lab appt for 08/01 at 115p.     Pt states he does need referral to nephrologist. Update sent to Dr Parker.

## 2022-05-12 NOTE — PROGRESS NOTES
CMP is stable, he does have evidence of chronic kidney that is stable.  If he doesn't have a nephrologist, I can refer him to one    His vitamin D level is very low, I am ordering supplementation    His B12 level is low, I am ordering supplementation for that also    Will plan to repeat labs in 3 months to follow, orders placed

## 2022-08-01 ENCOUNTER — LAB (OUTPATIENT)
Dept: GASTROENTEROLOGY | Facility: CLINIC | Age: 77
End: 2022-08-01

## 2022-08-05 ENCOUNTER — TELEPHONE (OUTPATIENT)
Dept: GASTROENTEROLOGY | Facility: CLINIC | Age: 77
End: 2022-08-05

## 2022-08-05 NOTE — TELEPHONE ENCOUNTER
----- Message from Jeniffer Parker MD sent at 8/4/2022  5:06 PM EDT -----  His BMP shows that his renal function is still low consistent with chronic kidney disease.    His vitamin D level has improved but is still slightly lower than normal.  He needs to continue the vitamin D supplementation    Back in May, I had referred him to nephrology, has he seen them regarding his chronic kidney disease?

## 2022-08-05 NOTE — TELEPHONE ENCOUNTER
Call to pt.  Advise per DR Parker note.  Verb understanding.     States has not heard from Nephrology.      See referral of 5/12 to DR Galilea Vicente.  Call to that office @ [phone] and spoke with Megan.  Per instructions, referral - including 5/12 referral, 5/10 o/v note and labs, labs of 8/1,  demographic/insurance info faxed to [phone] - confirmation received.     Awaiting appt.

## 2022-08-12 NOTE — TELEPHONE ENCOUNTER
"See care everywhere 8/10: \"Telephone Encounter - Iwona Baca - 08/10/2022 3:24 PM EDT  Formatting of this note might be different from the original.  Called and scheduled pt for new pt appt. Updated demographics, pt currently does not have a regular pcp. ww  Electronically signed by Iwona Baca at 08/10/2022 3:25 PM EDT\"    Update to DR Parker.     "

## 2022-08-25 ENCOUNTER — TRANSCRIBE ORDERS (OUTPATIENT)
Dept: ADMINISTRATIVE | Facility: HOSPITAL | Age: 77
End: 2022-08-25

## 2022-08-25 DIAGNOSIS — N18.31 CHRONIC KIDNEY DISEASE (CKD) STAGE G3A/A1, MODERATELY DECREASED GLOMERULAR FILTRATION RATE (GFR) BETWEEN 45-59 ML/MIN/1.73 SQUARE METER AND ALBUMINURIA CREATININE RATIO LESS THAN 30 MG/G (CMS/H*: Primary | ICD-10-CM

## 2022-09-12 ENCOUNTER — HOSPITAL ENCOUNTER (OUTPATIENT)
Dept: ULTRASOUND IMAGING | Facility: HOSPITAL | Age: 77
Discharge: HOME | End: 2022-09-12
Admitting: INTERNAL MEDICINE

## 2022-09-12 DIAGNOSIS — N18.31 CHRONIC KIDNEY DISEASE (CKD) STAGE G3A/A1, MODERATELY DECREASED GLOMERULAR FILTRATION RATE (GFR) BETWEEN 45-59 ML/MIN/1.73 SQUARE METER AND ALBUMINURIA CREATININE RATIO LESS THAN 30 MG/G (CMS/H*: ICD-10-CM

## 2022-09-12 PROCEDURE — 76775 US EXAM ABDO BACK WALL LIM: CPT

## 2022-09-13 ENCOUNTER — APPOINTMENT (OUTPATIENT)
Dept: ULTRASOUND IMAGING | Facility: HOSPITAL | Age: 77
End: 2022-09-13

## (undated) DEVICE — ADAPT CLN BIOGUARD AIR/H2O DISP

## (undated) DEVICE — TUBING, SUCTION, 1/4" X 10', STRAIGHT: Brand: MEDLINE

## (undated) DEVICE — SNAR POLYP CAPTIVATOR RND STFF 2.4 240CM 10MM 1P/U

## (undated) DEVICE — SENSR O2 OXIMAX FNGR A/ 18IN NONSTR

## (undated) DEVICE — CANN O2 ETCO2 FITS ALL CONN CO2 SMPL A/ 7IN DISP LF

## (undated) DEVICE — SINGLE-USE BIOPSY FORCEPS: Brand: RADIAL JAW 4

## (undated) DEVICE — LN SMPL CO2 SHTRM SD STREAM W/M LUER

## (undated) DEVICE — THE SINGLE USE ETRAP – POLYP TRAP IS USED FOR SUCTION RETRIEVAL OF ENDOSCOPICALLY REMOVED POLYPS.: Brand: ETRAP

## (undated) DEVICE — KT ORCA ORCAPOD DISP STRL